# Patient Record
Sex: MALE | Race: WHITE | NOT HISPANIC OR LATINO | URBAN - METROPOLITAN AREA
[De-identification: names, ages, dates, MRNs, and addresses within clinical notes are randomized per-mention and may not be internally consistent; named-entity substitution may affect disease eponyms.]

---

## 2020-05-26 LAB — EXT SARS-COV-2: NOT DETECTED

## 2020-05-28 ENCOUNTER — TELEPHONE (OUTPATIENT)
Dept: FAMILY MEDICINE CLINIC | Facility: CLINIC | Age: 85
End: 2020-05-28

## 2021-04-10 ENCOUNTER — NURSE TRIAGE (OUTPATIENT)
Dept: OTHER | Facility: OTHER | Age: 86
End: 2021-04-10

## 2021-04-10 NOTE — TELEPHONE ENCOUNTER
Regarding: Low BP   ----- Message from Michelle Hewitt RN sent at 4/10/2021 12:23 PM EDT -----  "I am admitting him to home care  He fell the other day and is now getting admitted to our service  His BP is 60/40   I just wanted to make a physician aware "

## 2021-04-10 NOTE — TELEPHONE ENCOUNTER
Spoke with visiting RN  Blood pressure was taken 4 times and consistently came with systolic in 03C  Advised call for EMS  Reason for Disposition   [9] Systolic BP < 80 AND [4] NOT dizzy, lightheaded or weak    Answer Assessment - Initial Assessment Questions  1  BLOOD PRESSURE: "What is the blood pressure?" "Did you take at least two measurements 5 minutes apart?"      60/40  2  ONSET: "When did you take your blood pressure?"      30 minutes ago  3  HOW: "How did you obtain the blood pressure?" (e g , visiting nurse, automatic home BP monitor)      Visiting nurse  4  HISTORY: "Do you have a history of low blood pressure?" "What is your blood pressure normally?"      Typically hypotensive, SBP 80s  5  MEDICATIONS: "Are you taking any medications for blood pressure?" If yes: "Have they been changed recently?"      Quinipril, had this morning  6  PULSE RATE: "Do you know what your pulse rate is?"       64  7   OTHER SYMPTOMS: "Have you been sick recently?" "Have you had a recent injury?"      Has parkinson's symptoms    Protocols used: LOW BLOOD PRESSURE-ADULT-AH

## 2021-04-23 ENCOUNTER — TELEPHONE (OUTPATIENT)
Dept: FAMILY MEDICINE CLINIC | Facility: CLINIC | Age: 86
End: 2021-04-23

## 2021-04-23 NOTE — TELEPHONE ENCOUNTER
Sydnee from [facility] called to inform you that pt was discharged from her care today  His wound is completely scabbed over and is looking good

## 2021-05-31 ENCOUNTER — TELEPHONE (OUTPATIENT)
Dept: OTHER | Facility: OTHER | Age: 86
End: 2021-05-31

## 2021-05-31 NOTE — TELEPHONE ENCOUNTER
Sowmya calling from [facility] the [place]  He was sent to the hospital via 911 for a choking incident  He was unresponsive when he left but was breathing  He was sent to [facility]  They believe he will be admitted

## 2021-06-07 ENCOUNTER — TELEPHONE (OUTPATIENT)
Dept: FAMILY MEDICINE CLINIC | Facility: CLINIC | Age: 86
End: 2021-06-07

## 2021-06-10 ENCOUNTER — TELEPHONE (OUTPATIENT)
Dept: OTHER | Facility: OTHER | Age: 86
End: 2021-06-10